# Patient Record
Sex: FEMALE | Race: WHITE | URBAN - METROPOLITAN AREA
[De-identification: names, ages, dates, MRNs, and addresses within clinical notes are randomized per-mention and may not be internally consistent; named-entity substitution may affect disease eponyms.]

---

## 2022-03-28 ENCOUNTER — OFFICE VISIT (OUTPATIENT)
Dept: PRIMARY CARE CLINIC | Age: 52
End: 2022-03-28
Payer: COMMERCIAL

## 2022-03-28 VITALS
DIASTOLIC BLOOD PRESSURE: 72 MMHG | HEIGHT: 68 IN | TEMPERATURE: 98.4 F | HEART RATE: 72 BPM | SYSTOLIC BLOOD PRESSURE: 116 MMHG | BODY MASS INDEX: 16.52 KG/M2 | OXYGEN SATURATION: 100 % | RESPIRATION RATE: 20 BRPM | WEIGHT: 109 LBS

## 2022-03-28 DIAGNOSIS — R23.9 SKIN CHANGE: ICD-10-CM

## 2022-03-28 DIAGNOSIS — R68.89 COLD INTOLERANCE: ICD-10-CM

## 2022-03-28 DIAGNOSIS — R53.1 WEAKNESS: Primary | ICD-10-CM

## 2022-03-28 DIAGNOSIS — Z00.00 HEALTHCARE MAINTENANCE: ICD-10-CM

## 2022-03-28 DIAGNOSIS — K90.0 CELIAC DISEASE: ICD-10-CM

## 2022-03-28 PROCEDURE — G8484 FLU IMMUNIZE NO ADMIN: HCPCS | Performed by: STUDENT IN AN ORGANIZED HEALTH CARE EDUCATION/TRAINING PROGRAM

## 2022-03-28 PROCEDURE — G8419 CALC BMI OUT NRM PARAM NOF/U: HCPCS | Performed by: STUDENT IN AN ORGANIZED HEALTH CARE EDUCATION/TRAINING PROGRAM

## 2022-03-28 PROCEDURE — 4004F PT TOBACCO SCREEN RCVD TLK: CPT | Performed by: STUDENT IN AN ORGANIZED HEALTH CARE EDUCATION/TRAINING PROGRAM

## 2022-03-28 PROCEDURE — 99204 OFFICE O/P NEW MOD 45 MIN: CPT | Performed by: STUDENT IN AN ORGANIZED HEALTH CARE EDUCATION/TRAINING PROGRAM

## 2022-03-28 PROCEDURE — 3017F COLORECTAL CA SCREEN DOC REV: CPT | Performed by: STUDENT IN AN ORGANIZED HEALTH CARE EDUCATION/TRAINING PROGRAM

## 2022-03-28 PROCEDURE — 36415 COLL VENOUS BLD VENIPUNCTURE: CPT | Performed by: STUDENT IN AN ORGANIZED HEALTH CARE EDUCATION/TRAINING PROGRAM

## 2022-03-28 PROCEDURE — G8427 DOCREV CUR MEDS BY ELIG CLIN: HCPCS | Performed by: STUDENT IN AN ORGANIZED HEALTH CARE EDUCATION/TRAINING PROGRAM

## 2022-03-28 SDOH — ECONOMIC STABILITY: FOOD INSECURITY: WITHIN THE PAST 12 MONTHS, THE FOOD YOU BOUGHT JUST DIDN'T LAST AND YOU DIDN'T HAVE MONEY TO GET MORE.: NEVER TRUE

## 2022-03-28 SDOH — ECONOMIC STABILITY: FOOD INSECURITY: WITHIN THE PAST 12 MONTHS, YOU WORRIED THAT YOUR FOOD WOULD RUN OUT BEFORE YOU GOT MONEY TO BUY MORE.: NEVER TRUE

## 2022-03-28 ASSESSMENT — PATIENT HEALTH QUESTIONNAIRE - PHQ9
SUM OF ALL RESPONSES TO PHQ QUESTIONS 1-9: 0
SUM OF ALL RESPONSES TO PHQ9 QUESTIONS 1 & 2: 0
2. FEELING DOWN, DEPRESSED OR HOPELESS: 0
1. LITTLE INTEREST OR PLEASURE IN DOING THINGS: 0
SUM OF ALL RESPONSES TO PHQ QUESTIONS 1-9: 0

## 2022-03-28 ASSESSMENT — SOCIAL DETERMINANTS OF HEALTH (SDOH): HOW HARD IS IT FOR YOU TO PAY FOR THE VERY BASICS LIKE FOOD, HOUSING, MEDICAL CARE, AND HEATING?: NOT HARD AT ALL

## 2022-03-28 NOTE — PATIENT INSTRUCTIONS
(STIs). You can help prevent STIs if you wait to have sex with a new partner (or partners) until you've each been tested for STIs. It also helps if you use condoms (male or female condoms) and if you limit your sex partners to one person who only has sex with you. Vaccines are available for some STIs. · If you think you may have a problem with alcohol or drug use, talk to your doctor. This includes prescription medicines (such as amphetamines and opioids) and illegal drugs (such as cocaine and methamphetamine). Your doctor can help you figure out what type of treatment is best for you. · Protect your skin from too much sun. When you're outdoors from 10 a.m. to 4 p.m., stay in the shade or cover up with clothing and a hat with a wide brim. Wear sunglasses that block UV rays. Even when it's cloudy, put broad-spectrum sunscreen (SPF 30 or higher) on any exposed skin. · See a dentist one or two times a year for checkups and to have your teeth cleaned. · Wear a seat belt in the car. When should you call for help? Watch closely for changes in your health, and be sure to contact your doctor if you have any problems or symptoms that concern you. Where can you learn more? Go to [email or web address] org and sign in to your [employer] account. Enter H452 in the [facility] box to learn more about \"Well Visit, Women 50 to 72: Care Instructions. \"     If you do not have an account, please click on the \"Sign Up Now\" link. Current as of: October 6, 2021               Content Version: 13.1  © 8249-1396 Healthwise, Incorporated. Care instructions adapted under license by [facility] ([facility]). If you have questions about a medical condition or this instruction, always ask your healthcare professional. [place] [address] any warranty or liability for your use of this information.

## 2022-03-28 NOTE — PROGRESS NOTES
[facility]    Patient:  Mehreen Ace 45 y.o. female     Date of Service: 3/28/2022       Chief complaint:   Chief Complaint   Patient presents with    New Patient     NP to establish       History of Present Illness   Patient presents today to establish care. She is changing PCP. Patient reports past medical history of celiac disease (not on any medication). Patient denies surgical history. Family history reviewed. Patient denies smoking cigarettes, drinking alcohol, or recreational drug use. Celiac disease/neurological disease: over the past 6 months (and worse in the past 2.5 weeks), she hasn't felt herself. She reports tunnel vision in her eyes (worse while driving) and blurry vision when reading. Problems with balance, sometimes she has troubles walking. When she eats glutin, she has vertigo and has to sit down. Sometimes she feels like when she is walking, her brain and her legs don't communicate with each other. She is also experiencing abdominal tightness. Sometimes she feels she can have warning signs and symptoms before she actually has BMs (she feels it in her head and sometimes with muscle spasms). She is having regular BMs (twice a day). Of note, patient had MRI brain 11-12 years ago with reportedly normal results. She also experiences muscle spasms in her legs and pain. 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 Skin issues: dry skin: she experiences cracks that do not heal. She has had this for years. Usually they bleed. She has been tested before for vitamin B12 and vitamin D deficiency, with abnormal results. Sleep problem/fatigue: she feels that she is fatigued all the times. Not sleepiness. This happens irrespective of how much she sleeps overnight. She does not think she snores. Cold/heat intolerance: She is postmenopausal.  LMP 6-7 years ago. This started a long time ago.     Care gaps: last pap was along time ago. (about 13 years). Has never had colon cancer screening. She is not interested in addressing these at this time, until her symptoms are properly diagnosed/treated. Past Medical History:      Diagnosis Date    Celiac disease        Past Surgical History:    History reviewed. No pertinent surgical history. Allergies:    Gluten meal, Amoxicillin, Amoxicillin-pot clavulanate, Guaifenesin er, and Other    Social History:   Social History     Socioeconomic History    Marital status:      Spouse name: Not on file    Number of children: Not on file    Years of education: Not on file    Highest education level: Not on file   Occupational History    Not on file   Tobacco Use    Smoking status: Never Smoker    Smokeless tobacco: Never Used   Substance and Sexual Activity    Alcohol use: Never    Drug use: Never    Sexual activity: Not on file   Other Topics Concern    Not on file   Social History Narrative    Not on file     Social Determinants of Health     Financial Resource Strain: Low Risk     Difficulty of Paying Living Expenses: Not hard at all   Food Insecurity: No Food Insecurity    Worried About [address] in the Last Year: Never true    [address] in the Last Year: Never true   Transportation Needs:     Lack of Transportation (Medical): Not on file    Lack of Transportation (Non-Medical):  Not on file   Physical Activity:     Days of Exercise per Week: Not on file    Minutes of Exercise per Session: Not on file   Stress:     Feeling of Stress : Not on file   Social Connections:     Frequency of Communication with Friends and Family: Not on file    Frequency of Social Gatherings with Friends and Family: Not on file    Attends Congregational Services: Not on file    Active Member of Clubs or Organizations: Not on file    Attends Club or Organization Meetings: Not on file    Marital Status: Not on file   Intimate Partner Violence:     Fear of Current or Ex-Partner: Not on file    Emotionally Abused: Not on file    Physically Abused: Not on file    Sexually Abused: Not on file   Housing Stability:     Unable to Pay for Housing in the Last Year: Not on file    Number of Places Lived in the Last Year: Not on file    Unstable Housing in the Last Year: Not on file       Family History:       Problem Relation Age of Onset    No Known Problems Mother     No Known Problems Father     No Known Problems Brother        Reviewof Systems:   Review of Systems   Constitutional: Positive for fatigue. Negative for fever. Respiratory: Negative for shortness of breath and wheezing. Cardiovascular: Negative for chest pain and palpitations. Gastrointestinal: Negative for abdominal pain. Endocrine: Positive for cold intolerance and heat intolerance. Skin:        Cracks   Neurological: Positive for weakness. Physical Exam   Vitals: /72   Pulse 72   Temp 98.4 °F (36.9 °C)   Resp 20   Ht 5' 8\" (1.727 m)   Wt 109 lb (49.4 kg)   SpO2 100%   BMI 16.57 kg/m²   Physical Exam  Constitutional:       Appearance: Normal appearance. Cardiovascular:      Rate and Rhythm: Normal rate and regular rhythm. Pulses: Normal pulses. Heart sounds: Normal heart sounds. Pulmonary:      Effort: Pulmonary effort is normal.      Breath sounds: Normal breath sounds. Musculoskeletal:         General: No swelling or tenderness. Skin:     Comments: Skin cracks in the dorsum of hands, at metacarpal level, no bleeding    Neurological:      Mental Status: She is alert and oriented to person, place, and time. Cranial Nerves: No cranial nerve deficit. Motor: No weakness. Coordination: Coordination normal.      Gait: Gait normal.      Comments: Normal Romberg at this time   Psychiatric:         Behavior: Behavior normal.         Thought Content: Thought content normal.           No results found for this visit on 03/28/22. Assessment and Plan   1. Weakness  Assessment & Plan:  Constellation of symptoms is concerning for multiple sclerosis. Again, to me this is not related to her seemingly controlled celiac disease. Patient is agreeable to be referred to neurology for further evaluation. Orders:  -     Osman Skelton MD, Neurology, [facility]  2. Celiac disease  Assessment & Plan:  Controlled. Patient is well aware of what foods to avoid. She has not had any issues with flareups in a while. Neurological symptoms described in HPI are not typically consistent with celiac disease. 3. Cold intolerance  Assessment & Plan: This could be related to her neurological symptoms or to her low BMI. We will address caloric intake at follow-up visit. 4. Skin change  Assessment & Plan:   Skin cuts on bilateral dorsum of hands, at metacarpal level, old cuts on her right dorsum of wrist.  According to patient they are spontaneous, recurrent, and this has been going on for some years. This could be from eczema. Patient recommended to keep her hands moist and also a trial of clobetasol. Orders:  -     clobetasol (TEMOVATE) 0.05 % cream; Apply topically twice a day., Disp-45 g, R-0, Normal  5. Healthcare maintenance  Assessment & Plan:  Per patient's wishes, we will address care gaps once her current symptoms are managed. Orders:  -     CBC with Auto Differential  -     Comprehensive Metabolic Panel  -     Vitamin B12 & Folate  -     Vitamin D 25 Hydroxy  -     TSH  -     LIPID PANEL      Issues to address at future visit/s:     Return to Office: Return in about 2 months (around 5/28/2022) for follow up . Medication List:    Current Outpatient Medications   Medication Sig Dispense Refill    clobetasol (TEMOVATE) 0.05 % cream Apply topically twice a day. 45 g 0     No current facility-administered medications for this visit.         Electronically signed by Cindy Landry MD on 3/28/2022 at 9:29 AM     This dictation was generated by voice recognition computer software. Although all attempts are made to edit the dictation for accuracy, there may be errors in the transcription that are not intended.

## 2022-03-29 PROBLEM — R68.89 COLD INTOLERANCE: Status: ACTIVE | Noted: 2022-03-29

## 2022-03-29 PROBLEM — R53.1 WEAKNESS: Status: ACTIVE | Noted: 2022-03-29

## 2022-03-29 PROBLEM — R23.9 SKIN CHANGE: Status: ACTIVE | Noted: 2022-03-29

## 2022-03-29 PROBLEM — K90.0 CELIAC DISEASE: Status: ACTIVE | Noted: 2022-03-29

## 2022-03-29 PROBLEM — Z00.00 HEALTHCARE MAINTENANCE: Status: ACTIVE | Noted: 2022-03-29

## 2022-03-29 LAB
A/G RATIO: 1.8 (ref 1.1–2.2)
ALBUMIN SERPL-MCNC: 4.6 G/DL (ref 3.4–5)
ALP BLD-CCNC: 72 U/L (ref 40–129)
ALT SERPL-CCNC: 12 U/L (ref 10–40)
ANION GAP SERPL CALCULATED.3IONS-SCNC: 21 MMOL/L (ref 3–16)
AST SERPL-CCNC: 21 U/L (ref 15–37)
BASOPHILS ABSOLUTE: 0 K/UL (ref 0–0.2)
BASOPHILS RELATIVE PERCENT: 0.8 %
BILIRUB SERPL-MCNC: 0.4 MG/DL (ref 0–1)
BUN BLDV-MCNC: 5 MG/DL (ref 7–20)
CALCIUM SERPL-MCNC: 9.5 MG/DL (ref 8.3–10.6)
CHLORIDE BLD-SCNC: 107 MMOL/L (ref 99–110)
CHOLESTEROL, TOTAL: 127 MG/DL (ref 0–199)
CO2: 20 MMOL/L (ref 21–32)
CREAT SERPL-MCNC: 0.6 MG/DL (ref 0.6–1.1)
EOSINOPHILS ABSOLUTE: 0 K/UL (ref 0–0.6)
EOSINOPHILS RELATIVE PERCENT: 0.4 %
FOLATE: 13.23 NG/ML (ref 4.78–24.2)
GFR AFRICAN AMERICAN: >60
GFR NON-AFRICAN AMERICAN: >60
GLUCOSE BLD-MCNC: 97 MG/DL (ref 70–99)
HCT VFR BLD CALC: 39.7 % (ref 36–48)
HDLC SERPL-MCNC: 67 MG/DL (ref 40–60)
HEMOGLOBIN: 13.1 G/DL (ref 12–16)
LDL CHOLESTEROL CALCULATED: 43 MG/DL
LYMPHOCYTES ABSOLUTE: 1.1 K/UL (ref 1–5.1)
LYMPHOCYTES RELATIVE PERCENT: 23.2 %
MCH RBC QN AUTO: 31.4 PG (ref 26–34)
MCHC RBC AUTO-ENTMCNC: 33.1 G/DL (ref 31–36)
MCV RBC AUTO: 95 FL (ref 80–100)
MONOCYTES ABSOLUTE: 0.3 K/UL (ref 0–1.3)
MONOCYTES RELATIVE PERCENT: 6.8 %
NEUTROPHILS ABSOLUTE: 3.3 K/UL (ref 1.7–7.7)
NEUTROPHILS RELATIVE PERCENT: 68.8 %
PDW BLD-RTO: 13.5 % (ref 12.4–15.4)
PLATELET # BLD: 190 K/UL (ref 135–450)
PMV BLD AUTO: 11 FL (ref 5–10.5)
POTASSIUM SERPL-SCNC: 4.2 MMOL/L (ref 3.5–5.1)
RBC # BLD: 4.18 M/UL (ref 4–5.2)
SODIUM BLD-SCNC: 148 MMOL/L (ref 136–145)
TOTAL PROTEIN: 7.2 G/DL (ref 6.4–8.2)
TRIGL SERPL-MCNC: 85 MG/DL (ref 0–150)
TSH SERPL DL<=0.05 MIU/L-ACNC: 2.89 UIU/ML (ref 0.27–4.2)
VITAMIN B-12: <150 PG/ML (ref 211–911)
VITAMIN D 25-HYDROXY: 23 NG/ML
VLDLC SERPL CALC-MCNC: 17 MG/DL
WBC # BLD: 4.9 K/UL (ref 4–11)

## 2022-03-29 RX ORDER — CLOBETASOL PROPIONATE 0.5 MG/G
CREAM TOPICAL
Qty: 45 G | Refills: 0 | Status: SHIPPED | OUTPATIENT
Start: 2022-03-29

## 2022-03-29 ASSESSMENT — ENCOUNTER SYMPTOMS
WHEEZING: 0
ABDOMINAL PAIN: 0
ROS SKIN COMMENTS: CRACKS
SHORTNESS OF BREATH: 0

## 2022-03-29 NOTE — ASSESSMENT & PLAN NOTE
Skin cuts on bilateral dorsum of hands, at metacarpal level, old cuts on her right dorsum of wrist.  According to patient they are spontaneous, recurrent, and this has been going on for some years. This could be from eczema. Patient recommended to keep her hands moist and also a trial of clobetasol.

## 2022-03-29 NOTE — ASSESSMENT & PLAN NOTE
Controlled. Patient is well aware of what foods to avoid. She has not had any issues with flareups in a while. Neurological symptoms described in HPI are not typically consistent with celiac disease.

## 2022-03-29 NOTE — ASSESSMENT & PLAN NOTE
Constellation of symptoms is concerning for multiple sclerosis. Again, to me this is not related to her seemingly controlled celiac disease. Patient is agreeable to be referred to neurology for further evaluation.

## 2022-03-29 NOTE — ASSESSMENT & PLAN NOTE
This could be related to her neurological symptoms or to her low BMI. We will address caloric intake at follow-up visit.

## 2022-04-28 PROBLEM — Z00.00 HEALTHCARE MAINTENANCE: Status: RESOLVED | Noted: 2022-03-29 | Resolved: 2022-04-28

## 2022-06-27 LAB — COLOGUARD TEST, EXTERNAL: NORMAL

## 2022-06-28 DIAGNOSIS — E55.9 VITAMIN D DEFICIENCY: ICD-10-CM

## 2022-06-28 RX ORDER — ERGOCALCIFEROL 1.25 MG/1
50000 CAPSULE ORAL WEEKLY
Qty: 12 CAPSULE | Refills: 0 | Status: SHIPPED | OUTPATIENT
Start: 2022-06-28 | End: 2022-09-19

## 2022-06-28 NOTE — TELEPHONE ENCOUNTER
Medication:   Requested Prescriptions     Pending Prescriptions Disp Refills    vitamin D (ERGOCALCIFEROL) 1.25 MG (56025 UT) CAPS capsule [Pharmacy Med Name: Vitamin D (Ergocalciferol) Oral Capsule 1.25 MG (97856 UT)] 12 capsule 0     Sig: Take 1 capsule by mouth once a week        Last Filled:  04/01/2022    Patient Phone Number: [phone] (home)     Last appt: 3/28/2022   Next appt: Visit date not found    Last OARRS: No flowsheet data found.

## 2022-06-28 NOTE — TELEPHONE ENCOUNTER
Please ask patient to schedule a follow-up visit with me after she sees neurology (appointment set for 7/21/2022).   Thanks

## 2022-07-21 ENCOUNTER — TELEPHONE (OUTPATIENT)
Dept: NEUROLOGY | Age: 52
End: 2022-07-21

## 2022-07-21 ENCOUNTER — OFFICE VISIT (OUTPATIENT)
Dept: NEUROLOGY | Age: 52
End: 2022-07-21
Payer: COMMERCIAL

## 2022-07-21 VITALS
SYSTOLIC BLOOD PRESSURE: 104 MMHG | DIASTOLIC BLOOD PRESSURE: 87 MMHG | WEIGHT: 109 LBS | HEART RATE: 87 BPM | BODY MASS INDEX: 16.52 KG/M2 | HEIGHT: 68 IN

## 2022-07-21 DIAGNOSIS — R53.83 OTHER FATIGUE: ICD-10-CM

## 2022-07-21 DIAGNOSIS — R27.0 ATAXIA: ICD-10-CM

## 2022-07-21 DIAGNOSIS — R42 DIZZINESS: ICD-10-CM

## 2022-07-21 DIAGNOSIS — R27.0 ATAXIA: Primary | ICD-10-CM

## 2022-07-21 DIAGNOSIS — E53.8 B12 DEFICIENCY: ICD-10-CM

## 2022-07-21 DIAGNOSIS — H53.8 BLURRED VISION: ICD-10-CM

## 2022-07-21 LAB
REASON FOR REJECTION: NORMAL
REJECTED TEST: NORMAL

## 2022-07-21 PROCEDURE — G8427 DOCREV CUR MEDS BY ELIG CLIN: HCPCS | Performed by: PSYCHIATRY & NEUROLOGY

## 2022-07-21 PROCEDURE — 99204 OFFICE O/P NEW MOD 45 MIN: CPT | Performed by: PSYCHIATRY & NEUROLOGY

## 2022-07-21 PROCEDURE — G8419 CALC BMI OUT NRM PARAM NOF/U: HCPCS | Performed by: PSYCHIATRY & NEUROLOGY

## 2022-07-21 NOTE — TELEPHONE ENCOUNTER
[facility] lab called pt will need to go back for a redraw al of the Vitmain B 1 and Vitamin B 6 was not put in correct tube or spun within the allotted time for the specimen. Called pt pt back to let her know she will need to go back.

## 2022-07-21 NOTE — PROGRESS NOTES
NEUROLOGY CONSULTATION     Chief Complaint   Patient presents with    New Patient     weakness       HISTORY OF PRESENT ILLNESS :    Dre Lee is a 47 y.o. female who is referred by Dr. Brody Lopez   History was obtained from patient  Patient was referred for several neurological symptoms. Patient complains of dizziness and poor balance. She has had them for more than 10 years. MRI brain done about 10 years ago at outside hospital was normal.  Over the years patient has continued to have the symptoms but they are getting worse. Patient has known celiac disease. She was initially told that her symptoms were related to the celiac disease. Patient has good days and bad days. About 3 years ago she was told that she had B12 deficiency. She tried taking the tablets but they did not agree with that and so she has been very inconsistent with that and stopped it. Patient has been on vitamin D which she thinks helps her to some extent. She also complains of visual changes. She complains of \"slanted vision\". She has tingling and numbness in her legs and her legs feel weak at times.     REVIEW OF SYSTEMS    Constitutional:  []   Chills   [x]  Fatigue   []  Fevers   []  Malaise   []  Weight loss     [] Denies all of the above    Eyes:  []  Double vision   [x]  Blurry vision     [] Denies all of the above    Ears, nose, mouth, throat, and face:   [] Hearing loss    []   Hoarseness      []  Snoring    [x]  Tinnitus       [] Denies all of the above     Respiratory:   []  Cough    []  Shortness of breath         [x] Denies all of the above     Cardiovascular:   []  Chest pain    []  Exertional chest pressure/discomfort           [] Palpitations    []  Syncope     [x] Denies all of the above    Gastrointestinal:   []  Abdominal pain   []  Constipation    []  Diarrhea    []   Dysphagia                      [x] Denies all of the above    Genitourinary:      []  Frequency   []  Hematuria     []  Urinary incontinence           [x] Denies all of the above     Hematologic/lymphatic:  []  Bleeding    []  Easy bruising   []  Anemia  [x] Denies all of the above     Musculoskeletal:   [] Back pain       []  Myalgias    []  Neck pain           [] Denies all of the above    Neurological: As noted in HPI    Behavioral/Psych:   [] Anxiety    []  Depression     []  Mood swings     [x] Denies all of the above     Endocrine:   []  Temperature intolerance     [x] Fatigue      [] Denies all of the above     Allergic/Immunologic:   [] Hay fever    [x] Denies all of the above     Past Medical History:   Diagnosis Date    Celiac disease      Family History   Problem Relation Age of Onset    No Known Problems Mother     No Known Problems Father     No Known Problems Brother      Social History     Socioeconomic History    Marital status:      Spouse name: None    Number of children: None    Years of education: None    Highest education level: None   Tobacco Use    Smoking status: Never    Smokeless tobacco: Never   Substance and Sexual Activity    Alcohol use: Never    Drug use: Never     Social Determinants of Health     Financial Resource Strain: Low Risk     Difficulty of Paying Living Expenses: Not hard at all   Food Insecurity: No Food Insecurity    Worried About Running Out of Food in the Last Year: Never true    Ran Out of Food in the Last Year: Never true       PHYSICAL EXAMINATION:  /87   Pulse 87   Ht 5' 8\" (1.727 m)   Wt 109 lb (49.4 kg)   BMI 16.57 kg/m²   Appearance: Well appearing, well nourished and in no distress  Mental Status Exam: Patient is alert, oriented to person, place and time.    Recent and remote memory is normal  Fund of Knowledge is normal  Attention/concentration is normal.   Speech : No dysarthria  Language : No aphasia  Funduscopic Exam: sharp disc margins  Cranial Nerves:   II: Visual fields:  Full to confrontation  III: Pupils:  equal, round, reactive to light  III,IV,VI: Extra Ocular Movements are intact. No nystagmus  V: Facial sensation is intact to pin prick and light touch  VII: Facial strength and movements: intact and symmetric smile,cheek puffing and eyebrow elevation  VIII: Hearing:  Intact to finger rub bilaterally  IX: Palate  elevation is symmetric  XI: Shoulder shrug is intact  XII: Tongue movements are normal  Motor:  Muscle tone and bulk are normal.   Strength is symmetrical 5/5 in all four extremities. Sensory: Nondermatomal sensory loss in the distal lower extremities  Coordination:  Normal  Finger to Nose and Heel to Shin bilaterally    . Reflexes:  DTR +2 and symmetric bilaterally  Plantar response: Flexor bilaterally  Gait: Gait and station is normal. Romberg: negative  Vascular: No carotid bruit bilaterally      DATA:  LABS:  General Labs:  CBC:   Lab Results   Component Value Date/Time    WBC 4.9 03/28/2022 04:53 PM    RBC 4.18 03/28/2022 04:53 PM    HGB 13.1 03/28/2022 04:53 PM    HCT 39.7 03/28/2022 04:53 PM    MCV 95.0 03/28/2022 04:53 PM    MCH 31.4 03/28/2022 04:53 PM    MCHC 33.1 03/28/2022 04:53 PM    RDW 13.5 03/28/2022 04:53 PM     03/28/2022 04:53 PM    MPV 11.0 03/28/2022 04:53 PM     BMP:    Lab Results   Component Value Date/Time     03/28/2022 04:53 PM    K 4.2 03/28/2022 04:53 PM     03/28/2022 04:53 PM    CO2 20 03/28/2022 04:53 PM    BUN 5 03/28/2022 04:53 PM    LABALBU 4.6 03/28/2022 04:53 PM    CREATININE 0.6 03/28/2022 04:53 PM    CALCIUM 9.5 03/28/2022 04:53 PM    GFRAA >60 03/28/2022 04:53 PM    LABGLOM >60 03/28/2022 04:53 PM    GLUCOSE 97 03/28/2022 04:53 PM     RADIOLOGY REVIEW:  I have reviewed radiology report(s) of: MRI brain from 2012 done at [address] [place] [address] :  Multiple neurological symptoms including dizziness unsteadiness ataxia tingling and numbness. Patient has severe B12 deficiency. Her B12 level was less than 150. Patient has not been consistent with oral replacement medication. Given the severity of her deficiency, I doubt that oral replacement would be sufficient in her case given also the neurological symptoms. She has known celiac disease. We need to consider and rule out other nutritional deficiencies as well. RECOMMENDATIONS :  Discussed with patient  I have strongly recommended to restart B12 injections. I would recommend B12 injections once every 2 weeks for the first 2 to 3 months and then continue with once monthly B12 injections indefinitely. Patient prefers to get this done through her primary care physician. I will also check B1 B6 levels as well as serum copper levels. I will see her back in 4 months for follow-up. Thank you for this consultation. Please note a portion of this chart was generated using dragon dictation software. Although every effort was made to ensure the accuracy of this automated transcription, some errors in transcription may have occurred.

## 2022-07-26 ENCOUNTER — TELEPHONE (OUTPATIENT)
Dept: PRIMARY CARE CLINIC | Age: 52
End: 2022-07-26

## 2022-07-26 LAB — COPPER: 104.1 UG/DL (ref 80–155)

## 2022-07-26 NOTE — TELEPHONE ENCOUNTER
Pt called and said Dr. Bo Maria told her she should take B12 shots; Would like to have them done in our office.

## 2022-07-27 NOTE — TELEPHONE ENCOUNTER
Not sure, we will have to check with [facility] and see if this is feasible. Frequency should be either daily injection (deep subq or IM) for 1/week then once a week for 4-8 weeks or until neurological symptoms resolve; or every other day or at least 3 times a week for 3 weeks then 1/week for one week or until symptoms resolve. After that maintenance therapy can be once a month injections or daily tablets if absorption is intact.     Liudmila Harrison MD

## 2022-07-29 NOTE — TELEPHONE ENCOUNTER
Patient stated the neurologist said she should get the injections every 2 weeks for three months and then monthly after that. But patient said she is willing to do the injections.

## 2022-08-02 LAB — VITAMIN B6: 50.9 NMOL/L (ref 20–125)

## 2022-08-03 NOTE — TELEPHONE ENCOUNTER
Patient notified and Nurse visit schedule was given to her. She will call back to schedule. No further action is required for this encounter.

## 2022-08-04 LAB — VITAMIN B1, PLASMA: 7 NMOL/L (ref 4–15)

## 2022-08-24 ENCOUNTER — OFFICE VISIT (OUTPATIENT)
Dept: PRIMARY CARE CLINIC | Age: 52
End: 2022-08-24
Payer: COMMERCIAL

## 2022-08-24 VITALS
HEIGHT: 68 IN | RESPIRATION RATE: 14 BRPM | HEART RATE: 81 BPM | WEIGHT: 107 LBS | OXYGEN SATURATION: 97 % | TEMPERATURE: 98.5 F | BODY MASS INDEX: 16.22 KG/M2 | SYSTOLIC BLOOD PRESSURE: 122 MMHG | DIASTOLIC BLOOD PRESSURE: 70 MMHG

## 2022-08-24 DIAGNOSIS — Z12.31 ENCOUNTER FOR SCREENING MAMMOGRAM FOR MALIGNANT NEOPLASM OF BREAST: ICD-10-CM

## 2022-08-24 DIAGNOSIS — E53.8 VITAMIN B12 DEFICIENCY: Primary | ICD-10-CM

## 2022-08-24 DIAGNOSIS — E55.9 VITAMIN D DEFICIENCY: ICD-10-CM

## 2022-08-24 PROBLEM — R73.03 PREDIABETES: Status: ACTIVE | Noted: 2019-11-04

## 2022-08-24 LAB
FOLATE: 8.25 NG/ML (ref 4.78–24.2)
VITAMIN B-12: 485 PG/ML (ref 211–911)

## 2022-08-24 PROCEDURE — 36415 COLL VENOUS BLD VENIPUNCTURE: CPT | Performed by: STUDENT IN AN ORGANIZED HEALTH CARE EDUCATION/TRAINING PROGRAM

## 2022-08-24 PROCEDURE — G8419 CALC BMI OUT NRM PARAM NOF/U: HCPCS | Performed by: STUDENT IN AN ORGANIZED HEALTH CARE EDUCATION/TRAINING PROGRAM

## 2022-08-24 PROCEDURE — 3017F COLORECTAL CA SCREEN DOC REV: CPT | Performed by: STUDENT IN AN ORGANIZED HEALTH CARE EDUCATION/TRAINING PROGRAM

## 2022-08-24 PROCEDURE — G8427 DOCREV CUR MEDS BY ELIG CLIN: HCPCS | Performed by: STUDENT IN AN ORGANIZED HEALTH CARE EDUCATION/TRAINING PROGRAM

## 2022-08-24 PROCEDURE — 99214 OFFICE O/P EST MOD 30 MIN: CPT | Performed by: STUDENT IN AN ORGANIZED HEALTH CARE EDUCATION/TRAINING PROGRAM

## 2022-08-24 PROCEDURE — 1036F TOBACCO NON-USER: CPT | Performed by: STUDENT IN AN ORGANIZED HEALTH CARE EDUCATION/TRAINING PROGRAM

## 2022-08-24 ASSESSMENT — ENCOUNTER SYMPTOMS
ABDOMINAL PAIN: 0
WHEEZING: 0
SHORTNESS OF BREATH: 0

## 2022-08-24 NOTE — PROGRESS NOTES
Patient:  Jessica Massey 47 y.o. female     Date of Service: 8/24/2022       Chief complaint:   Chief Complaint   Patient presents with    Follow-up     Weakness, fatigue, feeling cold, did see neuro/recommended B12 injections as well as neuro did, started taking B12 more diligently, would like re checked. History of Present Illness   Patient presents today for follow-up. B12 deficiency: This was diagnosed several months ago. Patient was seen by neurologist and was recommended to take vitamin B12 injections. The plan was originally for patient to receive regular B12 injections at this clinic, however patient has been taking oral vitamin B12 for the past month. She is hoping this can improve her B12 level, without the need of injections. Of note, patient has history of celiac disease and malabsorption. This could be related to low vitamin B12. Low Vitamin D: patient has been taking supplements. Care gaps: Patient is due for breast cancer screening. She sees OB/GYN for cervical cancer screening. Reviewof Systems:   Review of Systems   Constitutional:  Negative for fever. Respiratory:  Negative for shortness of breath and wheezing. Cardiovascular:  Negative for chest pain and palpitations. Gastrointestinal:  Negative for abdominal pain. Neurological:  Positive for weakness. Neuropathy     Physical Exam   Vitals: /70   Pulse 81   Temp 98.5 °F (36.9 °C)   Resp 14   Ht 5' 8\" (1.727 m)   Wt 107 lb (48.5 kg)   SpO2 97%   BMI 16.27 kg/m²   Physical Exam  Constitutional:       Appearance: Normal appearance. Cardiovascular:      Rate and Rhythm: Normal rate and regular rhythm. Pulses: Normal pulses. Heart sounds: Normal heart sounds. Pulmonary:      Effort: Pulmonary effort is normal.      Breath sounds: Normal breath sounds. Musculoskeletal:         General: No swelling or tenderness.    Neurological:      Mental Status: She is alert and oriented

## 2022-08-24 NOTE — ASSESSMENT & PLAN NOTE
We will check levels today. If improving then we will continue to monitor while she takes oral B12 supplements. However her history of celiac disease could be causing malabsorption for B12, so she was informed that injections could be still needed.

## 2022-08-31 DIAGNOSIS — E53.8 VITAMIN B12 DEFICIENCY: Primary | ICD-10-CM

## 2022-08-31 RX ORDER — FOLIC ACID 1 MG/1
1 TABLET ORAL DAILY
Qty: 90 TABLET | Refills: 1 | Status: SHIPPED | OUTPATIENT
Start: 2022-08-31

## 2022-09-19 DIAGNOSIS — E55.9 VITAMIN D DEFICIENCY: ICD-10-CM

## 2022-09-19 RX ORDER — ERGOCALCIFEROL 1.25 MG/1
50000 CAPSULE ORAL WEEKLY
Qty: 12 CAPSULE | Refills: 0 | Status: SHIPPED | OUTPATIENT
Start: 2022-09-19

## 2022-09-19 NOTE — TELEPHONE ENCOUNTER
Medication:   Requested Prescriptions     Pending Prescriptions Disp Refills    vitamin D (ERGOCALCIFEROL) 1.25 MG (98331 UT) CAPS capsule [Pharmacy Med Name: Vitamin D (Ergocalciferol) Oral Capsule 1.25 MG (36535 UT)] 12 capsule 0     Sig: Take 1 capsule by mouth once a week        Last Filled:  06/28/2022 #12 with no refills     Patient Phone Number: [phone] (home)     Last appt: 8/24/2022   Next appt: 10/24/2022    Last OARRS: No flowsheet data found.

## 2022-09-23 PROBLEM — Z12.31 ENCOUNTER FOR SCREENING MAMMOGRAM FOR MALIGNANT NEOPLASM OF BREAST: Status: RESOLVED | Noted: 2022-08-24 | Resolved: 2022-09-23

## 2022-10-24 ENCOUNTER — OFFICE VISIT (OUTPATIENT)
Dept: PRIMARY CARE CLINIC | Age: 52
End: 2022-10-24
Payer: COMMERCIAL

## 2022-10-24 VITALS
WEIGHT: 111 LBS | TEMPERATURE: 98.7 F | HEART RATE: 74 BPM | DIASTOLIC BLOOD PRESSURE: 70 MMHG | OXYGEN SATURATION: 100 % | RESPIRATION RATE: 14 BRPM | HEIGHT: 68 IN | BODY MASS INDEX: 16.82 KG/M2 | SYSTOLIC BLOOD PRESSURE: 110 MMHG

## 2022-10-24 DIAGNOSIS — E53.8 VITAMIN B12 DEFICIENCY: Primary | ICD-10-CM

## 2022-10-24 DIAGNOSIS — E55.9 VITAMIN D DEFICIENCY: ICD-10-CM

## 2022-10-24 DIAGNOSIS — Z13.1 DIABETES MELLITUS SCREENING: ICD-10-CM

## 2022-10-24 LAB
FOLATE: >20 NG/ML (ref 4.78–24.2)
VITAMIN B-12: 517 PG/ML (ref 211–911)
VITAMIN D 25-HYDROXY: 58.4 NG/ML

## 2022-10-24 PROCEDURE — 3017F COLORECTAL CA SCREEN DOC REV: CPT | Performed by: STUDENT IN AN ORGANIZED HEALTH CARE EDUCATION/TRAINING PROGRAM

## 2022-10-24 PROCEDURE — G8427 DOCREV CUR MEDS BY ELIG CLIN: HCPCS | Performed by: STUDENT IN AN ORGANIZED HEALTH CARE EDUCATION/TRAINING PROGRAM

## 2022-10-24 PROCEDURE — 36415 COLL VENOUS BLD VENIPUNCTURE: CPT | Performed by: STUDENT IN AN ORGANIZED HEALTH CARE EDUCATION/TRAINING PROGRAM

## 2022-10-24 PROCEDURE — G8484 FLU IMMUNIZE NO ADMIN: HCPCS | Performed by: STUDENT IN AN ORGANIZED HEALTH CARE EDUCATION/TRAINING PROGRAM

## 2022-10-24 PROCEDURE — 99214 OFFICE O/P EST MOD 30 MIN: CPT | Performed by: STUDENT IN AN ORGANIZED HEALTH CARE EDUCATION/TRAINING PROGRAM

## 2022-10-24 PROCEDURE — G8419 CALC BMI OUT NRM PARAM NOF/U: HCPCS | Performed by: STUDENT IN AN ORGANIZED HEALTH CARE EDUCATION/TRAINING PROGRAM

## 2022-10-24 PROCEDURE — 1036F TOBACCO NON-USER: CPT | Performed by: STUDENT IN AN ORGANIZED HEALTH CARE EDUCATION/TRAINING PROGRAM

## 2022-10-24 ASSESSMENT — ENCOUNTER SYMPTOMS
ABDOMINAL PAIN: 0
SHORTNESS OF BREATH: 0
WHEEZING: 0

## 2022-10-24 NOTE — ASSESSMENT & PLAN NOTE
Improving. It appears the oral B12 is therapeutic. Overall symptoms gradually improving. We will check serum level today.

## 2022-10-24 NOTE — PROGRESS NOTES
Patient:  Forrest Upton 47 y.o. female     Date of Service: 10/24/2022       Chief complaint:   Chief Complaint   Patient presents with    Follow-up     B12 deficiency, has been taking daily still with vit D and folic acid. History of Present Illness   Patient presents today for follow-up. B12 deficiency: This was diagnosed several months ago. Patient was seen by neurologist and was recommended to take vitamin B12 injections. The plan was originally for patient to receive regular B12 injections at this clinic, however patient has been taking oral vitamin B12 for the past few months. On recent blood work, B12 . Of note, patient has history of celiac disease and malabsorption. This could be related to low vitamin B12. Today patient reports symptoms are gradually improving. Patient has appt with neurologist at the end of November. Low Vitamin D: patient has been taking supplements. Care gaps: Patient is yet to complete mammogram as ordered. She sees OB/GYN for cervical cancer screening. [employer] Systems:   Review of Systems   Constitutional:  Negative for fever. Respiratory:  Negative for shortness of breath and wheezing. Cardiovascular:  Negative for chest pain and palpitations. Gastrointestinal:  Negative for abdominal pain. Neurological:  Positive for weakness. Neuropathy     Physical Exam   Vitals: /70   Pulse 74   Temp 98.7 °F (37.1 °C)   Resp 14   Ht 5' 8\" (1.727 m)   Wt 111 lb (50.3 kg)   SpO2 100%   BMI 16.88 kg/m²   Physical Exam  Constitutional:       Appearance: Normal appearance. Cardiovascular:      Rate and Rhythm: Normal rate and regular rhythm. Pulses: Normal pulses. Heart sounds: Normal heart sounds. Pulmonary:      Effort: Pulmonary effort is normal.      Breath sounds: Normal breath sounds. Musculoskeletal:         General: No swelling or tenderness.    Neurological:      Mental Status: She is alert and oriented to person, place, and time. Psychiatric:         Mood and Affect: Mood normal.         Behavior: Behavior normal.          No results found for this visit on 10/24/22. Assessment and Plan   1. Vitamin B12 deficiency  Assessment & Plan:  Improving. It appears the oral B12 is therapeutic. Overall symptoms gradually improving. We will check serum level today. Orders:  -     Vitamin B12 & Folate  2. Vitamin D deficiency  Assessment & Plan:  Stable. Continue with supplements. Will check serum level today. Orders:  -     Vitamin D 25 Hydroxy  3. Diabetes mellitus screening  -     Hemoglobin A1C    Issues to address at future visit/s:     Return to Office: Return in about 6 months (around 4/24/2023) for follow up . Medication List:    Current Outpatient Medications   Medication Sig Dispense Refill    vitamin D (ERGOCALCIFEROL) 1.25 MG (40410 UT) CAPS capsule Take 1 capsule by mouth once a week 12 capsule 0    folic acid (FOLVITE) 1 MG tablet Take 1 tablet by mouth daily 90 tablet 1    vitamin B-12 (CYANOCOBALAMIN) 1000 MCG tablet Take 2 tablets by mouth daily 60 tablet 2    clobetasol (TEMOVATE) 0.05 % cream Apply topically twice a day. 45 g 0     No current facility-administered medications for this visit. Electronically signed by Rosalva Mena MD on 10/24/2022 at 9:13 AM     This dictation was generated by voice recognition computer software. Although all attempts are made to edit the dictation for accuracy, there may be errors in the transcription that are not intended.

## 2022-10-25 LAB
ESTIMATED AVERAGE GLUCOSE: 114 MG/DL
HBA1C MFR BLD: 5.6 %

## 2022-11-28 ENCOUNTER — OFFICE VISIT (OUTPATIENT)
Dept: NEUROLOGY | Age: 52
End: 2022-11-28
Payer: COMMERCIAL

## 2022-11-28 VITALS
WEIGHT: 111 LBS | BODY MASS INDEX: 16.82 KG/M2 | HEART RATE: 66 BPM | SYSTOLIC BLOOD PRESSURE: 109 MMHG | DIASTOLIC BLOOD PRESSURE: 73 MMHG | HEIGHT: 68 IN

## 2022-11-28 DIAGNOSIS — E53.8 B12 DEFICIENCY: Primary | ICD-10-CM

## 2022-11-28 DIAGNOSIS — R42 DIZZINESS: ICD-10-CM

## 2022-11-28 PROCEDURE — G8484 FLU IMMUNIZE NO ADMIN: HCPCS | Performed by: PSYCHIATRY & NEUROLOGY

## 2022-11-28 PROCEDURE — 1036F TOBACCO NON-USER: CPT | Performed by: PSYCHIATRY & NEUROLOGY

## 2022-11-28 PROCEDURE — 99213 OFFICE O/P EST LOW 20 MIN: CPT | Performed by: PSYCHIATRY & NEUROLOGY

## 2022-11-28 PROCEDURE — G8419 CALC BMI OUT NRM PARAM NOF/U: HCPCS | Performed by: PSYCHIATRY & NEUROLOGY

## 2022-11-28 PROCEDURE — G8427 DOCREV CUR MEDS BY ELIG CLIN: HCPCS | Performed by: PSYCHIATRY & NEUROLOGY

## 2022-11-28 PROCEDURE — 3017F COLORECTAL CA SCREEN DOC REV: CPT | Performed by: PSYCHIATRY & NEUROLOGY

## 2022-11-28 NOTE — PROGRESS NOTES
Adonis White   Neurology followup    Subjective:   CC/HP  History was obtained from the patient. Patient states that she is doing better. Her symptoms are slowly improving. She was initially on B12 injections but now is taking B12 tablets as per the advice of her primary care physician who has been checking her levels. Background history:  Patient complains of dizziness and poor balance. She has had them for more than 10 years. MRI brain done about 10 years ago at outside hospital was normal.  Over the years patient has continued to have the symptoms but they are getting worse. Patient has known celiac disease. She was initially told that her symptoms were related to the celiac disease. Patient has good days and bad days. About 3 years ago she was told that she had B12 deficiency. She tried taking the tablets but they did not agree with that and so she has been very inconsistent with that and stopped it. Patient has been on vitamin D which she thinks helps her to some extent. She also complains of visual changes. She complains of \"slanted vision\".   She has tingling and numbness in her legs and her legs feel weak at times    REVIEW OF SYSTEMS    Constitutional:  []   Chills   []  Fatigue   []  Fevers   []  Malaise   []  Weight loss     [x] Denies all of the above    Respiratory:   []  Cough    []  Shortness of breath         [x] Denies all of the above     Cardiovascular:   []  Chest pain    []  Exertional chest pressure/discomfort           [] Palpitations    []  Syncope     [x] Denies all of the above        Past Medical History:   Diagnosis Date    Celiac disease      Family History   Problem Relation Age of Onset    No Known Problems Mother     No Known Problems Father     No Known Problems Brother      Social History     Socioeconomic History    Marital status:    Tobacco Use    Smoking status: Never    Smokeless tobacco: Never   Substance and Sexual Activity    Alcohol use: Never Drug use: Never     Social Determinants of Health     Financial Resource Strain: Low Risk     Difficulty of Paying Living Expenses: Not hard at all   Food Insecurity: No Food Insecurity    Worried About Running Out of Food in the Last Year: Never true    Ran Out of Food in the Last Year: Never true        Objective:  Exam:  /73   Pulse 66   Ht 5' 8\" (1.727 m)   Wt 111 lb (50.3 kg)   BMI 16.88 kg/m²   This is a well-nourished patient in no acute distress  Patient is awake, alert and oriented x3. Speech is normal.  Pupils are equal round reacting to light. Extraocular movements intact. Face symmetrical. Tongue midline. Motor exam shows normal symmetrical strength. Deep tendon reflexes normal. Plantar reflexes downgoing. Sensory exam normal. Coordination normal. Gait normal. No carotid bruit. No neck stiffness. Data :  LABS:  General Labs:  CBC:   Lab Results   Component Value Date/Time    WBC 4.9 03/28/2022 04:53 PM    RBC 4.18 03/28/2022 04:53 PM    HGB 13.1 03/28/2022 04:53 PM    HCT 39.7 03/28/2022 04:53 PM    MCV 95.0 03/28/2022 04:53 PM    MCH 31.4 03/28/2022 04:53 PM    MCHC 33.1 03/28/2022 04:53 PM    RDW 13.5 03/28/2022 04:53 PM     03/28/2022 04:53 PM    MPV 11.0 03/28/2022 04:53 PM     BMP:    Lab Results   Component Value Date/Time     03/28/2022 04:53 PM    K 4.2 03/28/2022 04:53 PM     03/28/2022 04:53 PM    CO2 20 03/28/2022 04:53 PM    BUN 5 03/28/2022 04:53 PM    LABALBU 4.6 03/28/2022 04:53 PM    CREATININE 0.6 03/28/2022 04:53 PM    CALCIUM 9.5 03/28/2022 04:53 PM    GFRAA >60 03/28/2022 04:53 PM    LABGLOM >60 03/28/2022 04:53 PM    GLUCOSE 97 03/28/2022 04:53 PM       Impression :  Neurological symptoms from B12 deficiency, now improved with B12 replacement  Celiac disease    Plan :  Discussed with patient  Since she is doing neurologically fairly well I will schedule him for as needed basis.   Given for celiac disease it may be preferable on the B12 injections because of questions with absorption but would leave this up to the primary care physician at this time who has been advising her in this matter. Please note a portion of  this chart was generated using dragon dictation software. Although every effort was made to ensure the accuracy of this automated transcription, some errors in transcription may have occurred.

## 2022-12-12 DIAGNOSIS — E55.9 VITAMIN D DEFICIENCY: ICD-10-CM

## 2022-12-12 RX ORDER — ERGOCALCIFEROL 1.25 MG/1
CAPSULE ORAL
Qty: 12 CAPSULE | Refills: 0 | Status: SHIPPED | OUTPATIENT
Start: 2022-12-12

## 2022-12-12 NOTE — TELEPHONE ENCOUNTER
Medication:   Requested Prescriptions     Pending Prescriptions Disp Refills    vitamin D (ERGOCALCIFEROL) 1.25 MG (43278 UT) CAPS capsule [Pharmacy Med Name: Vitamin D (Ergocalciferol) Oral Capsule 1.25 MG (03088 UT)] 12 capsule 0     Sig: TAKE 1 CAPSULE BY MOUTH ONE TIME A WEEK        Last Filled:  9/19/2022 - 12 capsules    Patient Phone Number: [phone] (home)     Last appt: 10/24/2022   Next appt: 4/24/2023    Last OARRS: No flowsheet data found.

## 2023-01-24 ENCOUNTER — OFFICE VISIT (OUTPATIENT)
Dept: PRIMARY CARE CLINIC | Age: 53
End: 2023-01-24
Payer: COMMERCIAL

## 2023-01-24 VITALS
TEMPERATURE: 99.2 F | DIASTOLIC BLOOD PRESSURE: 68 MMHG | WEIGHT: 114 LBS | BODY MASS INDEX: 17.28 KG/M2 | OXYGEN SATURATION: 99 % | HEART RATE: 84 BPM | SYSTOLIC BLOOD PRESSURE: 102 MMHG | HEIGHT: 68 IN | RESPIRATION RATE: 16 BRPM

## 2023-01-24 DIAGNOSIS — Z86.39 HX OF NON ANEMIC VITAMIN B12 DEFICIENCY: ICD-10-CM

## 2023-01-24 DIAGNOSIS — M25.572 LEFT ANKLE PAIN, UNSPECIFIED CHRONICITY: ICD-10-CM

## 2023-01-24 DIAGNOSIS — M25.472 LEFT ANKLE SWELLING: ICD-10-CM

## 2023-01-24 DIAGNOSIS — M79.672 LEFT FOOT PAIN: Primary | ICD-10-CM

## 2023-01-24 PROCEDURE — G8484 FLU IMMUNIZE NO ADMIN: HCPCS | Performed by: STUDENT IN AN ORGANIZED HEALTH CARE EDUCATION/TRAINING PROGRAM

## 2023-01-24 PROCEDURE — 36415 COLL VENOUS BLD VENIPUNCTURE: CPT | Performed by: STUDENT IN AN ORGANIZED HEALTH CARE EDUCATION/TRAINING PROGRAM

## 2023-01-24 PROCEDURE — 1036F TOBACCO NON-USER: CPT | Performed by: STUDENT IN AN ORGANIZED HEALTH CARE EDUCATION/TRAINING PROGRAM

## 2023-01-24 PROCEDURE — 3017F COLORECTAL CA SCREEN DOC REV: CPT | Performed by: STUDENT IN AN ORGANIZED HEALTH CARE EDUCATION/TRAINING PROGRAM

## 2023-01-24 PROCEDURE — G8419 CALC BMI OUT NRM PARAM NOF/U: HCPCS | Performed by: STUDENT IN AN ORGANIZED HEALTH CARE EDUCATION/TRAINING PROGRAM

## 2023-01-24 PROCEDURE — 99213 OFFICE O/P EST LOW 20 MIN: CPT | Performed by: STUDENT IN AN ORGANIZED HEALTH CARE EDUCATION/TRAINING PROGRAM

## 2023-01-24 PROCEDURE — G8427 DOCREV CUR MEDS BY ELIG CLIN: HCPCS | Performed by: STUDENT IN AN ORGANIZED HEALTH CARE EDUCATION/TRAINING PROGRAM

## 2023-01-24 ASSESSMENT — PATIENT HEALTH QUESTIONNAIRE - PHQ9
SUM OF ALL RESPONSES TO PHQ9 QUESTIONS 1 & 2: 0
SUM OF ALL RESPONSES TO PHQ QUESTIONS 1-9: 0
1. LITTLE INTEREST OR PLEASURE IN DOING THINGS: 0
2. FEELING DOWN, DEPRESSED OR HOPELESS: 0
SUM OF ALL RESPONSES TO PHQ QUESTIONS 1-9: 0

## 2023-01-24 NOTE — PROGRESS NOTES
Aleksandra Stearns (:  1970) is a 47 y.o. female,Established patient, here for evaluation of the following chief complaint(s): Foot Swelling (Pt is c/o pain and swelling in her left foot that comes and goes x 2 months )      Patient presents today for same-day visit with complaints of left foot swelling. Foot Swelling   The pain is present in the left foot. This is a new problem. The current episode started more than 1 month ago (a couple of months ago). There has been no history of extremity trauma. The problem occurs intermittently. The problem has been waxing and waning. The quality of the pain is described as sharp and burning. The pain is moderate. Associated symptoms include joint swelling, numbness (when walking, toes get numb at times) and stiffness. She has tried nothing for the symptoms. Family history does not include gout. There is no history of diabetes, gout, osteoarthritis or rheumatoid arthritis. It is difficult to go up and down the stairs due to stiffness. No known injury at the onset. B12 deficiency: This was diagnosed several months ago. Patient has been taking oral vitamin B12 for the past few months. She last saw neurologist 2 months ago and she is to follow up PRN. On recent blood work, B12 was normal.  Of note, patient has history of celiac disease and malabsorption. Today patient reports she no longer has symptoms of weakness and neuropathy that she used to experience when B12 was low. ASSESSMENT/PLAN:  1. Left foot pain  -     XR FOOT LEFT (2 VIEWS); Future  -     diclofenac sodium (VOLTAREN) 1 % GEL; Apply 4 g topically 4 times daily, Topical, 4 TIMES DAILY Starting 2023, Disp-150 g, R-1, Normal  2. Left ankle pain, unspecified chronicity  -     XR ANKLE LEFT (MIN 3 VIEWS); Future  -     diclofenac sodium (VOLTAREN) 1 % GEL; Apply 4 g topically 4 times daily, Topical, 4 TIMES DAILY Starting 2023, Disp-150 g, R-1, Normal  3.  Left ankle swelling  - XR ANKLE LEFT (MIN 3 VIEWS); Future  -     diclofenac sodium (VOLTAREN) 1 % GEL; Apply 4 g topically 4 times daily, Topical, 4 TIMES DAILY Starting Tue 1/24/2023, Disp-150 g, R-1, Normal  4. Hx of non anemic vitamin B12 deficiency  -     Vitamin B12 & Folate  Unclear cause of left foot and ankle pain and swelling. This could be from arthritis (OA or gouty) vs sprain. We will obtain XR and conservative management for now with home exercises and topical NSAIDs. If XR is unremarkable and symptoms persist, we will consider referral to sports medicine. We will also check B12 levels. Return if symptoms worsen or fail to improve. SUBJECTIVE/OBJECTIVE:    Review of Systems   Musculoskeletal:  Positive for stiffness. Negative for gout. Left foot pain   Neurological:  Positive for numbness (when walking, toes get numb at times). Vitals: /68   Pulse 84   Temp 99.2 °F (37.3 °C)   Resp 16   Ht 5' 8\" (1.727 m)   Wt 114 lb (51.7 kg)   SpO2 99%   BMI 17.33 kg/m²   Physical Exam  Constitutional:       General: She is not in acute distress. Appearance: Normal appearance. She is not ill-appearing or toxic-appearing. Musculoskeletal:      Comments: Foot exam  Visual inspection:  Deformity/amputation: absent  Skin lesions/pre-ulcerative calluses: absent  Edema: right- negative, left- trace (at ankle level)  Sensory exam:  Sensation: normal  Pulses: normal   Neurological:      Mental Status: She is alert and oriented to person, place, and time. Psychiatric:         Mood and Affect: Mood normal.         Behavior: Behavior normal.         No results found for this visit on 01/24/23. An electronic signature was used to authenticate this note. Electronically signed by Reina Hernández MD on 1/24/2023 at 4:26 PM     This dictation was generated by voice recognition computer software.   Although all attempts are made to edit the dictation for accuracy, there may be errors in the transcription that are not intended.

## 2023-01-25 ENCOUNTER — TELEPHONE (OUTPATIENT)
Dept: ADMINISTRATIVE | Age: 53
End: 2023-01-25

## 2023-01-25 LAB
FOLATE: >20 NG/ML (ref 4.78–24.2)
VITAMIN B-12: 561 PG/ML (ref 211–911)

## 2023-01-27 ENCOUNTER — HOSPITAL ENCOUNTER (OUTPATIENT)
Dept: GENERAL RADIOLOGY | Age: 53
Discharge: HOME | End: 2023-01-27
Payer: COMMERCIAL

## 2023-01-27 DIAGNOSIS — M25.472 LEFT ANKLE SWELLING: ICD-10-CM

## 2023-01-27 DIAGNOSIS — M79.672 LEFT FOOT PAIN: ICD-10-CM

## 2023-01-27 DIAGNOSIS — M25.572 LEFT ANKLE PAIN, UNSPECIFIED CHRONICITY: ICD-10-CM

## 2023-01-27 PROCEDURE — 73620 X-RAY EXAM OF FOOT: CPT

## 2023-01-27 PROCEDURE — 73610 X-RAY EXAM OF ANKLE: CPT

## 2023-03-20 DIAGNOSIS — E55.9 VITAMIN D DEFICIENCY: ICD-10-CM

## 2023-03-20 RX ORDER — ERGOCALCIFEROL 1.25 MG/1
CAPSULE ORAL
Qty: 12 CAPSULE | Refills: 0 | Status: SHIPPED | OUTPATIENT
Start: 2023-03-20

## 2023-04-24 ENCOUNTER — OFFICE VISIT (OUTPATIENT)
Dept: PRIMARY CARE CLINIC | Age: 53
End: 2023-04-24
Payer: COMMERCIAL

## 2023-04-24 VITALS
DIASTOLIC BLOOD PRESSURE: 80 MMHG | WEIGHT: 114 LBS | HEART RATE: 65 BPM | HEIGHT: 68 IN | TEMPERATURE: 98 F | BODY MASS INDEX: 17.28 KG/M2 | SYSTOLIC BLOOD PRESSURE: 98 MMHG | OXYGEN SATURATION: 99 % | RESPIRATION RATE: 16 BRPM

## 2023-04-24 DIAGNOSIS — R30.0 DYSURIA: ICD-10-CM

## 2023-04-24 DIAGNOSIS — M25.572 PAIN AND SWELLING OF ANKLE, LEFT: Primary | ICD-10-CM

## 2023-04-24 DIAGNOSIS — M25.472 PAIN AND SWELLING OF ANKLE, LEFT: Primary | ICD-10-CM

## 2023-04-24 DIAGNOSIS — M40.209 KYPHOSIS, UNSPECIFIED KYPHOSIS TYPE, UNSPECIFIED SPINAL REGION: ICD-10-CM

## 2023-04-24 DIAGNOSIS — Z12.4 CERVICAL CANCER SCREENING: ICD-10-CM

## 2023-04-24 DIAGNOSIS — E53.8 VITAMIN B12 DEFICIENCY: ICD-10-CM

## 2023-04-24 LAB
BILIRUB UR QL STRIP.AUTO: NEGATIVE
CHARACTER UR: NORMAL
CLARITY UR: CLEAR
COLOR UR: YELLOW
GLUCOSE UR STRIP.AUTO-MCNC: NEGATIVE MG/DL
HGB UR QL STRIP.AUTO: NEGATIVE
KETONES UR STRIP.AUTO-MCNC: NEGATIVE MG/DL
LEUKOCYTE ESTERASE UR QL STRIP.AUTO: ABNORMAL
NITRITE UR QL STRIP.AUTO: NEGATIVE
PH UR STRIP.AUTO: 7.5 [PH] (ref 5–8)
PROT UR STRIP.AUTO-MCNC: NEGATIVE MG/DL
RBC #/AREA URNS HPF: NORMAL /HPF (ref 0–4)
SP GR UR STRIP.AUTO: 1.01 (ref 1–1.03)
UA COMPLETE W REFLEX CULTURE PNL UR: ABNORMAL
UA DIPSTICK W REFLEX MICRO PNL UR: YES
URN SPEC COLLECT METH UR: ABNORMAL
UROBILINOGEN UR STRIP-ACNC: 0.2 E.U./DL
WBC #/AREA URNS HPF: NORMAL /HPF (ref 0–5)

## 2023-04-24 PROCEDURE — 99214 OFFICE O/P EST MOD 30 MIN: CPT | Performed by: FAMILY MEDICINE

## 2023-04-24 PROCEDURE — G8419 CALC BMI OUT NRM PARAM NOF/U: HCPCS | Performed by: FAMILY MEDICINE

## 2023-04-24 PROCEDURE — 1036F TOBACCO NON-USER: CPT | Performed by: FAMILY MEDICINE

## 2023-04-24 PROCEDURE — 81003 URINALYSIS AUTO W/O SCOPE: CPT | Performed by: FAMILY MEDICINE

## 2023-04-24 PROCEDURE — 3017F COLORECTAL CA SCREEN DOC REV: CPT | Performed by: FAMILY MEDICINE

## 2023-04-24 PROCEDURE — G8427 DOCREV CUR MEDS BY ELIG CLIN: HCPCS | Performed by: FAMILY MEDICINE

## 2023-04-24 SDOH — ECONOMIC STABILITY: FOOD INSECURITY: WITHIN THE PAST 12 MONTHS, YOU WORRIED THAT YOUR FOOD WOULD RUN OUT BEFORE YOU GOT MONEY TO BUY MORE.: NEVER TRUE

## 2023-04-24 SDOH — ECONOMIC STABILITY: HOUSING INSECURITY
IN THE LAST 12 MONTHS, WAS THERE A TIME WHEN YOU DID NOT HAVE A STEADY PLACE TO SLEEP OR SLEPT IN A SHELTER (INCLUDING NOW)?: NO

## 2023-04-24 SDOH — ECONOMIC STABILITY: FOOD INSECURITY: WITHIN THE PAST 12 MONTHS, THE FOOD YOU BOUGHT JUST DIDN'T LAST AND YOU DIDN'T HAVE MONEY TO GET MORE.: NEVER TRUE

## 2023-04-24 SDOH — ECONOMIC STABILITY: INCOME INSECURITY: HOW HARD IS IT FOR YOU TO PAY FOR THE VERY BASICS LIKE FOOD, HOUSING, MEDICAL CARE, AND HEATING?: NOT HARD AT ALL

## 2023-04-24 ASSESSMENT — ENCOUNTER SYMPTOMS
DIARRHEA: 0
SORE THROAT: 0
COUGH: 0
BLOOD IN STOOL: 0
NAUSEA: 0
SHORTNESS OF BREATH: 0
ABDOMINAL PAIN: 0
VOMITING: 0

## 2023-04-24 NOTE — PROGRESS NOTES
Chief Complaint   Patient presents with    New Patient     New to the provider    Foot Pain     6 month f/u- pt stated that her left foot is still swollen x January 2023. Other     Pt would like to discuss a pap smear- pended          Steven Baig is a 46 y.o. female who presents complaining of persistent L ankle/foot swelling and pain x 4 months. Pt denies any preceding trauma and did have xrays done which were remarkable only for soft tissue swelling. Pt was Rx topical Voltaren gel by Dr. Strange Fairly with temporary improvement    Pt denies any hx of diabetes, gout, HTN or asthma and denies any surgeries    Patient has a past medical history significant for celiac disease and is on a gluten free diet with good control but does take supplemental vitamin B12, vitamin D and folic acid secondary to noted deficiencies     Pt has a hx of Raynauds syndrome     Pt has never had a colonscopy but reports a normal cologuard stool test last year    Pt is a nonsmoker and denies any illegal drug or alcohol use    Pt did complete her COVID vaccine series    FHx negative for gout, rheumatoid arthritis, CAD or diabetes; FHx positive for colon cancer ([place])      Review of Systems   Constitutional:  Negative for fatigue and fever. HENT:  Negative for congestion, nosebleeds and sore throat. Eyes:         Negative blurred vision or diplopia   Respiratory:  Negative for cough and shortness of breath. Cardiovascular:  Negative for chest pain, palpitations and leg swelling. Gastrointestinal:  Negative for abdominal pain, blood in stool, diarrhea, nausea and vomiting. Negative melena or indigestion   Endocrine: Negative for polydipsia and polyuria. Genitourinary:  Positive for dysuria (at times x 2 weeks and pt is drinking cranberry juice with improvement). Negative for hematuria. Musculoskeletal:  Positive for arthralgias (L foot/ankle with swelling x 4 months). Skin:  Negative for rash.    Neurological:

## 2023-04-26 DIAGNOSIS — M25.572 PAIN AND SWELLING OF ANKLE, LEFT: ICD-10-CM

## 2023-04-26 DIAGNOSIS — M25.472 PAIN AND SWELLING OF ANKLE, LEFT: ICD-10-CM

## 2023-04-26 LAB
ALBUMIN SERPL-MCNC: 4.2 G/DL (ref 3.4–5)
ALBUMIN/GLOB SERPL: 1.6 {RATIO} (ref 1.1–2.2)
ALP SERPL-CCNC: 92 U/L (ref 40–129)
ALT SERPL-CCNC: 11 U/L (ref 10–40)
ANION GAP SERPL CALCULATED.3IONS-SCNC: 8 MMOL/L (ref 3–16)
AST SERPL-CCNC: 18 U/L (ref 15–37)
BASOPHILS # BLD: 0 K/UL (ref 0–0.2)
BASOPHILS NFR BLD: 0.8 %
BILIRUB SERPL-MCNC: 0.3 MG/DL (ref 0–1)
BUN SERPL-MCNC: 5 MG/DL (ref 7–20)
CALCIUM SERPL-MCNC: 9.1 MG/DL (ref 8.3–10.6)
CHLORIDE SERPL-SCNC: 103 MMOL/L (ref 99–110)
CO2 SERPL-SCNC: 29 MMOL/L (ref 21–32)
CREAT SERPL-MCNC: 0.7 MG/DL (ref 0.6–1.1)
DEPRECATED RDW RBC AUTO: 13.5 % (ref 12.4–15.4)
EOSINOPHIL # BLD: 0.1 K/UL (ref 0–0.6)
EOSINOPHIL NFR BLD: 1.5 %
ERYTHROCYTE [SEDIMENTATION RATE] IN BLOOD BY WESTERGREN METHOD: 17 MM/HR (ref 0–30)
GFR SERPLBLD CREATININE-BSD FMLA CKD-EPI: >60 ML/MIN/{1.73_M2}
GLUCOSE SERPL-MCNC: 93 MG/DL (ref 70–99)
HCT VFR BLD AUTO: 38.1 % (ref 36–48)
HGB BLD-MCNC: 12.7 G/DL (ref 12–16)
LYMPHOCYTES # BLD: 1.3 K/UL (ref 1–5.1)
LYMPHOCYTES NFR BLD: 32.5 %
MCH RBC QN AUTO: 31 PG (ref 26–34)
MCHC RBC AUTO-ENTMCNC: 33.4 G/DL (ref 31–36)
MCV RBC AUTO: 92.8 FL (ref 80–100)
MONOCYTES # BLD: 0.3 K/UL (ref 0–1.3)
MONOCYTES NFR BLD: 8.5 %
NEUTROPHILS # BLD: 2.3 K/UL (ref 1.7–7.7)
NEUTROPHILS NFR BLD: 56.7 %
PLATELET # BLD AUTO: 168 K/UL (ref 135–450)
PLATELET BLD QL SMEAR: ADEQUATE
PMV BLD AUTO: 11.5 FL (ref 5–10.5)
POTASSIUM SERPL-SCNC: 4.4 MMOL/L (ref 3.5–5.1)
PROT SERPL-MCNC: 6.8 G/DL (ref 6.4–8.2)
RBC # BLD AUTO: 4.11 M/UL (ref 4–5.2)
SLIDE REVIEW: ABNORMAL
SODIUM SERPL-SCNC: 140 MMOL/L (ref 136–145)
TSH SERPL DL<=0.005 MIU/L-ACNC: 3.53 UIU/ML (ref 0.27–4.2)
URATE SERPL-MCNC: 3.9 MG/DL (ref 2.6–6)
WBC # BLD AUTO: 4.1 K/UL (ref 4–11)

## 2023-04-27 LAB — RHEUMATOID FACT SER IA-ACNC: <10 IU/ML

## 2023-05-03 ENCOUNTER — TELEPHONE (OUTPATIENT)
Dept: PRIMARY CARE CLINIC | Age: 53
End: 2023-05-03

## 2023-06-09 DIAGNOSIS — E55.9 VITAMIN D DEFICIENCY: ICD-10-CM

## 2023-06-09 RX ORDER — ERGOCALCIFEROL 1.25 MG/1
CAPSULE ORAL
Qty: 12 CAPSULE | Refills: 0 | Status: SHIPPED | OUTPATIENT
Start: 2023-06-09

## 2023-06-09 NOTE — TELEPHONE ENCOUNTER
Medication:   Requested Prescriptions     Pending Prescriptions Disp Refills    vitamin D (ERGOCALCIFEROL) 1.25 MG (05313 UT) CAPS capsule [Pharmacy Med Name: Vitamin D (Ergocalciferol) Oral Capsule 1.25 MG (47244 UT)] 12 capsule 0     Sig: TAKE 1 CAPSULE BY MOUTH ONE TIME A WEEK        Last Filled:  3/20/2023 - 12 capsules     Patient Phone Number: [phone] (home)     Last appt: 4/24/2023 Return in about 2 months (around 6/24/2023  Next appt: 6/28/2023    Last OARRS: No flowsheet data found.

## 2023-11-15 ENCOUNTER — COMMUNITY OUTREACH (OUTPATIENT)
Dept: PRIMARY CARE CLINIC | Age: 53
End: 2023-11-15

## 2023-11-15 NOTE — PROGRESS NOTES
Patient's HM shows they are overdue for Mammogram and Cervical Cancer Screening. Care Everywhere and  files searched. No results to attach to order nor HM updated.
.

## 2024-01-17 ENCOUNTER — TELEPHONE (OUTPATIENT)
Dept: PRIMARY CARE CLINIC | Age: 54
End: 2024-01-17

## 2024-11-11 ENCOUNTER — OFFICE VISIT (OUTPATIENT)
Dept: PRIMARY CARE CLINIC | Age: 54
End: 2024-11-11
Payer: COMMERCIAL

## 2024-11-11 VITALS
OXYGEN SATURATION: 99 % | HEART RATE: 78 BPM | HEIGHT: 68 IN | WEIGHT: 111.6 LBS | BODY MASS INDEX: 16.91 KG/M2 | RESPIRATION RATE: 17 BRPM | SYSTOLIC BLOOD PRESSURE: 100 MMHG | TEMPERATURE: 98 F | DIASTOLIC BLOOD PRESSURE: 68 MMHG

## 2024-11-11 DIAGNOSIS — Z13.1 DIABETES MELLITUS SCREENING: ICD-10-CM

## 2024-11-11 DIAGNOSIS — Z11.59 ENCOUNTER FOR HEPATITIS C SCREENING TEST FOR LOW RISK PATIENT: ICD-10-CM

## 2024-11-11 DIAGNOSIS — Z13.220 SCREENING CHOLESTEROL LEVEL: ICD-10-CM

## 2024-11-11 DIAGNOSIS — K90.0 CELIAC DISEASE: ICD-10-CM

## 2024-11-11 DIAGNOSIS — Z11.4 ENCOUNTER FOR SCREENING FOR HIV: ICD-10-CM

## 2024-11-11 DIAGNOSIS — R35.0 FREQUENT URINATION: ICD-10-CM

## 2024-11-11 DIAGNOSIS — K90.0 CELIAC DISEASE: Primary | ICD-10-CM

## 2024-11-11 PROBLEM — R68.89 COLD INTOLERANCE: Status: RESOLVED | Noted: 2022-03-29 | Resolved: 2024-11-11

## 2024-11-11 PROBLEM — R53.1 WEAKNESS: Status: RESOLVED | Noted: 2022-03-29 | Resolved: 2024-11-11

## 2024-11-11 LAB
25(OH)D3 SERPL-MCNC: 34.9 NG/ML
ALBUMIN SERPL-MCNC: 4 G/DL (ref 3.4–5)
ALBUMIN/GLOB SERPL: 2 {RATIO} (ref 1.1–2.2)
ALP SERPL-CCNC: 71 U/L (ref 40–129)
ALT SERPL-CCNC: 13 U/L (ref 10–40)
ANION GAP SERPL CALCULATED.3IONS-SCNC: 5 MMOL/L (ref 3–16)
AST SERPL-CCNC: 23 U/L (ref 15–37)
BACTERIA URNS QL MICRO: ABNORMAL /HPF
BILIRUB SERPL-MCNC: 0.6 MG/DL (ref 0–1)
BILIRUB UR QL STRIP.AUTO: NEGATIVE
BUN SERPL-MCNC: 6 MG/DL (ref 7–20)
CALCIUM SERPL-MCNC: 9.2 MG/DL (ref 8.3–10.6)
CHLORIDE SERPL-SCNC: 99 MMOL/L (ref 99–110)
CHOLEST SERPL-MCNC: 117 MG/DL (ref 0–199)
CLARITY UR: CLEAR
CO2 SERPL-SCNC: 28 MMOL/L (ref 21–32)
COLOR UR: YELLOW
CREAT SERPL-MCNC: 0.7 MG/DL (ref 0.6–1.1)
DEPRECATED RDW RBC AUTO: 13.3 % (ref 12.4–15.4)
EPI CELLS #/AREA URNS AUTO: 0 /HPF (ref 0–5)
EST. AVERAGE GLUCOSE BLD GHB EST-MCNC: 114 MG/DL
FERRITIN SERPL IA-MCNC: 64.3 NG/ML (ref 15–150)
FOLATE SERPL-MCNC: 4.66 NG/ML (ref 4.78–24.2)
GFR SERPLBLD CREATININE-BSD FMLA CKD-EPI: >90 ML/MIN/{1.73_M2}
GLUCOSE SERPL-MCNC: 83 MG/DL (ref 70–99)
GLUCOSE UR STRIP.AUTO-MCNC: NEGATIVE MG/DL
HBA1C MFR BLD: 5.6 %
HCT VFR BLD AUTO: 37.4 % (ref 36–48)
HCV AB SERPL QL IA: NORMAL
HDLC SERPL-MCNC: 60 MG/DL (ref 40–60)
HGB BLD-MCNC: 12.5 G/DL (ref 12–16)
HGB UR QL STRIP.AUTO: NEGATIVE
HYALINE CASTS #/AREA URNS AUTO: 0 /LPF (ref 0–8)
IRON SATN MFR SERPL: 34 % (ref 15–50)
IRON SERPL-MCNC: 91 UG/DL (ref 37–145)
KETONES UR STRIP.AUTO-MCNC: NEGATIVE MG/DL
LDLC SERPL CALC-MCNC: 43 MG/DL
LEUKOCYTE ESTERASE UR QL STRIP.AUTO: ABNORMAL
MCH RBC QN AUTO: 31.2 PG (ref 26–34)
MCHC RBC AUTO-ENTMCNC: 33.4 G/DL (ref 31–36)
MCV RBC AUTO: 93.5 FL (ref 80–100)
NITRITE UR QL STRIP.AUTO: NEGATIVE
PH UR STRIP.AUTO: 8 [PH] (ref 5–8)
PLATELET # BLD AUTO: 167 K/UL (ref 135–450)
PMV BLD AUTO: 11.7 FL (ref 5–10.5)
POTASSIUM SERPL-SCNC: 3.9 MMOL/L (ref 3.5–5.1)
PROT SERPL-MCNC: 6 G/DL (ref 6.4–8.2)
PROT UR STRIP.AUTO-MCNC: NEGATIVE MG/DL
RBC # BLD AUTO: 4 M/UL (ref 4–5.2)
RBC CLUMPS #/AREA URNS AUTO: 0 /HPF (ref 0–4)
SODIUM SERPL-SCNC: 132 MMOL/L (ref 136–145)
SP GR UR STRIP.AUTO: 1 (ref 1–1.03)
TIBC SERPL-MCNC: 266 UG/DL (ref 260–445)
TRIGL SERPL-MCNC: 70 MG/DL (ref 0–150)
TSH SERPL DL<=0.005 MIU/L-ACNC: 1.84 UIU/ML (ref 0.27–4.2)
UA DIPSTICK W REFLEX MICRO PNL UR: YES
URN SPEC COLLECT METH UR: ABNORMAL
UROBILINOGEN UR STRIP-ACNC: 0.2 E.U./DL
VIT B12 SERPL-MCNC: 774 PG/ML (ref 211–911)
VLDLC SERPL CALC-MCNC: 14 MG/DL
WBC # BLD AUTO: 4.4 K/UL (ref 4–11)
WBC #/AREA URNS AUTO: 11 /HPF (ref 0–5)

## 2024-11-11 PROCEDURE — G8419 CALC BMI OUT NRM PARAM NOF/U: HCPCS | Performed by: FAMILY MEDICINE

## 2024-11-11 PROCEDURE — 99214 OFFICE O/P EST MOD 30 MIN: CPT | Performed by: FAMILY MEDICINE

## 2024-11-11 PROCEDURE — G8427 DOCREV CUR MEDS BY ELIG CLIN: HCPCS | Performed by: FAMILY MEDICINE

## 2024-11-11 PROCEDURE — G8484 FLU IMMUNIZE NO ADMIN: HCPCS | Performed by: FAMILY MEDICINE

## 2024-11-11 PROCEDURE — 1036F TOBACCO NON-USER: CPT | Performed by: FAMILY MEDICINE

## 2024-11-11 PROCEDURE — 3017F COLORECTAL CA SCREEN DOC REV: CPT | Performed by: FAMILY MEDICINE

## 2024-11-11 SDOH — ECONOMIC STABILITY: FOOD INSECURITY: WITHIN THE PAST 12 MONTHS, YOU WORRIED THAT YOUR FOOD WOULD RUN OUT BEFORE YOU GOT MONEY TO BUY MORE.: NEVER TRUE

## 2024-11-11 SDOH — ECONOMIC STABILITY: FOOD INSECURITY: WITHIN THE PAST 12 MONTHS, THE FOOD YOU BOUGHT JUST DIDN'T LAST AND YOU DIDN'T HAVE MONEY TO GET MORE.: NEVER TRUE

## 2024-11-11 SDOH — ECONOMIC STABILITY: INCOME INSECURITY: HOW HARD IS IT FOR YOU TO PAY FOR THE VERY BASICS LIKE FOOD, HOUSING, MEDICAL CARE, AND HEATING?: NOT HARD AT ALL

## 2024-11-11 ASSESSMENT — ENCOUNTER SYMPTOMS
ABDOMINAL PAIN: 0
COUGH: 0
BLOOD IN STOOL: 0
VOMITING: 0
COLOR CHANGE: 0
SHORTNESS OF BREATH: 0
DIARRHEA: 0
RHINORRHEA: 0
NAUSEA: 0

## 2024-11-11 ASSESSMENT — PATIENT HEALTH QUESTIONNAIRE - PHQ9
SUM OF ALL RESPONSES TO PHQ QUESTIONS 1-9: 0
SUM OF ALL RESPONSES TO PHQ9 QUESTIONS 1 & 2: 0
2. FEELING DOWN, DEPRESSED OR HOPELESS: NOT AT ALL
1. LITTLE INTEREST OR PLEASURE IN DOING THINGS: NOT AT ALL
SUM OF ALL RESPONSES TO PHQ QUESTIONS 1-9: 0

## 2024-11-11 NOTE — PROGRESS NOTES
Loreta Blake is a 54 y.o. year old female here for:    Chief Complaint:    Chief Complaint   Patient presents with    Urinary Frequency     Subjective:    Today, her current concerns include:    HPI:  #Frequent Urination  - Onset: 11 months or so  - Context: Waking up at night to urinate 1-2X/night. No change in water intake close to bedtime. Little caffeine intake. Does stay hydrated. Also with lots of thirst and hungry all the time. Hx of Celiac dz so struggles with nutrition. No previous colonoscopy.  - Quality: No blood or pain with this  - Severity: No pain  - Timing/Duration: Daily and constant  - Progression:  Stable  - Modifiers: Nothing known makes it better or worse  - Associated Symptoms: Per ROS as otherwise stated in this note  - Previous occurrence: Never before    Past Medical History:   Diagnosis Date    Ataxia 07/08/2013    B12 deficiency     Celiac disease     Cold intolerance 03/29/2022     Social History     Tobacco Use    Smoking status: Never    Smokeless tobacco: Never   Substance Use Topics    Alcohol use: Never    Drug use: Never     Family History   Problem Relation Age of Onset    No Known Problems Mother     No Known Problems Father     No Known Problems Brother     Colon Cancer Maternal Aunt         Dx in her 70s    Breast Cancer Neg Hx      History reviewed. No pertinent surgical history.      Current Outpatient Medications:     vitamin D (ERGOCALCIFEROL) 1.25 MG (30310 UT) CAPS capsule, TAKE 1 CAPSULE BY MOUTH ONE TIME A WEEK, Disp: 12 capsule, Rfl: 0    folic acid (FOLVITE) 1 MG tablet, Take 1 tablet by mouth daily, Disp: 90 tablet, Rfl: 1    vitamin B-12 (CYANOCOBALAMIN) 1000 MCG tablet, Take 2 tablets by mouth daily, Disp: 60 tablet, Rfl: 2  Allergies   Allergen Reactions    Gluten Meal Other (See Comments)    Amoxicillin Nausea Only     Dizziness and headache    Amoxicillin-Pot Clavulanate Nausea Only     Dizzy, headache    Guaifenesin Er Other (See Comments)     nausea

## 2024-11-11 NOTE — ASSESSMENT & PLAN NOTE
Unclear level of control, reports possible recent flare a few days ago, now resolved. Also with new urinary frequency - not UTD on pap and no prior colonoscopy - unclear if bowel inflammation affecting. She will consider GI referral and let me know. Call back/ED precautions discussed.

## 2024-11-11 NOTE — ASSESSMENT & PLAN NOTE
Poorly controlled, new issue, uncertain etiology or prognosis, needing evaluation and work-up. Will check labs as noted and urine. Referral to GYN today as well - ?vaginal atrophic changes/dryness. Will eval for endocrine causes as well. She is not UTD on preventative screenings (mammo - declined today). Denied dysuria. If eval is unrevealing, can consider urology as well. She was amenable to this. Call back/ED precautions discussed. She will consider annual/establishing care later and let us know.

## 2024-11-11 NOTE — PATIENT INSTRUCTIONS
Please find our [facility] Lab Location Guide below for your convenience!    CENTRAL LOCATIONS    1) [facility]  [address], [address]  [place], [place] [address]  Phone: [phone]    2) [facility]  [address], [place] [address]  Phone: [phone]    [facility] LOCATIONS    3) [facility]  [address]  [place], [place] [address]  Phone: [phone]    4) [facility]  [address] [place] [address]  Phone: [phone]    5) [facility]  [address] [place] [address]  Phone: [phone]    6) [facility]  [address]  [place] [address]   Phone: [phone]    [place] LOCATIONS    7) [facility]  [address]  [place] [address]  Phone: [phone]    8) [facility]  [address] [place] [address]  Phone: [phone]    9) [facility]  [address] [place] [address]  Phone: [phone]    10) [facility]  [address]  [place] [address]  Phone: [phone]    [place] LOCATIONS    11) [facility]  [address]  [place] [address]  Phone: [phone]    12) [facility]  [address]  [place] [address]  Phone: [phone]    [facility] LOCATIONS    13) [facility]  [address], [address]  [place] [address]  Phone: [phone]    14) [facility]  [address] [place] [address]  Phone: [phone]

## 2024-11-12 LAB
HIV 1+2 AB+HIV1 P24 AG SERPL QL IA: NORMAL
HIV 2 AB SERPL QL IA: NORMAL
HIV1 AB SERPL QL IA: NORMAL
HIV1 P24 AG SERPL QL IA: NORMAL